# Patient Record
Sex: FEMALE | Race: WHITE | NOT HISPANIC OR LATINO | Employment: OTHER | URBAN - METROPOLITAN AREA
[De-identification: names, ages, dates, MRNs, and addresses within clinical notes are randomized per-mention and may not be internally consistent; named-entity substitution may affect disease eponyms.]

---

## 2017-10-16 PROBLEM — C18.7 MALIGNANT NEOPLASM OF SIGMOID COLON: Status: ACTIVE | Noted: 2017-10-16

## 2017-10-16 PROBLEM — Z12.11 COLON CANCER SCREENING: Status: ACTIVE | Noted: 2017-10-16

## 2017-11-03 PROBLEM — N76.0 ABSCESS OF VAGINA: Status: ACTIVE | Noted: 2017-11-03

## 2017-11-06 PROBLEM — N73.9 PELVIC ABSCESS IN FEMALE: Status: ACTIVE | Noted: 2017-11-03

## 2017-11-06 PROBLEM — I10 HTN (HYPERTENSION), BENIGN: Status: ACTIVE | Noted: 2017-11-06

## 2017-11-10 PROBLEM — D75.839 THROMBOCYTOSIS: Status: ACTIVE | Noted: 2017-11-10

## 2018-06-21 PROBLEM — D75.839 THROMBOCYTOSIS: Status: RESOLVED | Noted: 2017-11-10 | Resolved: 2018-06-21

## 2018-11-12 PROBLEM — Z85.038 HISTORY OF COLON CANCER: Status: ACTIVE | Noted: 2018-11-12

## 2019-05-28 PROBLEM — E53.8 B12 DEFICIENCY: Status: ACTIVE | Noted: 2018-11-26

## 2019-05-28 PROBLEM — E66.09 CLASS 1 OBESITY DUE TO EXCESS CALORIES WITHOUT SERIOUS COMORBIDITY WITH BODY MASS INDEX (BMI) OF 30.0 TO 30.9 IN ADULT: Status: ACTIVE | Noted: 2019-05-28

## 2023-08-01 PROBLEM — R93.2 ABNORMAL CT SCAN, LIVER: Status: ACTIVE | Noted: 2023-08-01

## 2023-08-02 ENCOUNTER — TELEPHONE (OUTPATIENT)
Dept: SURGERY | Facility: CLINIC | Age: 68
End: 2023-08-02

## 2023-08-02 NOTE — TELEPHONE ENCOUNTER
After financial services reviewed patients insurance, [facility] is out of network.  Dr Luciano's office notified.

## 2023-09-13 PROBLEM — C78.7 METASTASIS TO LIVER: Status: ACTIVE | Noted: 2023-09-13

## 2023-11-13 PROBLEM — E87.6 HYPOKALEMIA: Status: ACTIVE | Noted: 2023-11-13
